# Patient Record
Sex: MALE | Race: WHITE | Employment: OTHER | URBAN - METROPOLITAN AREA
[De-identification: names, ages, dates, MRNs, and addresses within clinical notes are randomized per-mention and may not be internally consistent; named-entity substitution may affect disease eponyms.]

---

## 2022-03-23 ENCOUNTER — OUTSIDE FACILITY SERVICE (OUTPATIENT)
Dept: CARDIOLOGY | Facility: CLINIC | Age: 87
End: 2022-03-23

## 2022-03-23 PROCEDURE — 93016 CV STRESS TEST SUPVJ ONLY: CPT | Performed by: NURSE PRACTITIONER

## 2022-03-23 PROCEDURE — 93018 CV STRESS TEST I&R ONLY: CPT | Performed by: INTERNAL MEDICINE

## 2022-03-23 PROCEDURE — 78452 HT MUSCLE IMAGE SPECT MULT: CPT | Performed by: INTERNAL MEDICINE

## 2022-04-11 ENCOUNTER — OFFICE VISIT (OUTPATIENT)
Dept: CARDIOLOGY | Facility: CLINIC | Age: 87
End: 2022-04-11

## 2022-04-11 VITALS
SYSTOLIC BLOOD PRESSURE: 154 MMHG | DIASTOLIC BLOOD PRESSURE: 78 MMHG | WEIGHT: 173 LBS | BODY MASS INDEX: 26.22 KG/M2 | OXYGEN SATURATION: 96 % | HEIGHT: 68 IN | HEART RATE: 111 BPM

## 2022-04-11 DIAGNOSIS — R94.39 ABNORMAL STRESS TEST: ICD-10-CM

## 2022-04-11 DIAGNOSIS — E78.5 HYPERLIPIDEMIA LDL GOAL <100: ICD-10-CM

## 2022-04-11 DIAGNOSIS — I48.0 PAROXYSMAL ATRIAL FIBRILLATION: ICD-10-CM

## 2022-04-11 DIAGNOSIS — I10 ESSENTIAL HYPERTENSION: ICD-10-CM

## 2022-04-11 DIAGNOSIS — R07.9 CHEST PAIN, UNSPECIFIED TYPE: Primary | ICD-10-CM

## 2022-04-11 PROCEDURE — 99204 OFFICE O/P NEW MOD 45 MIN: CPT | Performed by: INTERNAL MEDICINE

## 2022-04-11 PROCEDURE — 93000 ELECTROCARDIOGRAM COMPLETE: CPT | Performed by: INTERNAL MEDICINE

## 2022-04-11 RX ORDER — LANOLIN ALCOHOL/MO/W.PET/CERES
400 CREAM (GRAM) TOPICAL DAILY
COMMUNITY

## 2022-04-11 RX ORDER — NITROFURANTOIN MACROCRYSTALS 50 MG/1
50 CAPSULE ORAL AS NEEDED
COMMUNITY

## 2022-04-11 RX ORDER — BIOTIN 1 MG
4 TABLET ORAL DAILY
COMMUNITY

## 2022-04-11 RX ORDER — CARBOXYMETHYLCELLULOSE SODIUM 5 MG/ML
SOLUTION/ DROPS OPHTHALMIC AS NEEDED
COMMUNITY

## 2022-04-11 RX ORDER — DIPHENOXYLATE HYDROCHLORIDE AND ATROPINE SULFATE 2.5; .025 MG/1; MG/1
2 TABLET ORAL DAILY
COMMUNITY
End: 2022-08-08

## 2022-04-11 RX ORDER — ASPIRIN 81 MG/1
81 TABLET ORAL DAILY
Qty: 90 TABLET | Refills: 3 | Status: SHIPPED | OUTPATIENT
Start: 2022-04-11 | End: 2022-08-08

## 2022-04-11 RX ORDER — DIPHENOXYLATE HYDROCHLORIDE AND ATROPINE SULFATE 2.5; .025 MG/1; MG/1
1 TABLET ORAL 3 TIMES DAILY
COMMUNITY
End: 2022-08-08

## 2022-04-11 RX ORDER — LORATADINE 10 MG/1
10 CAPSULE, LIQUID FILLED ORAL DAILY
COMMUNITY
End: 2022-08-08

## 2022-04-11 RX ORDER — METOPROLOL SUCCINATE 25 MG/1
TABLET, EXTENDED RELEASE ORAL
COMMUNITY

## 2022-04-11 NOTE — PROGRESS NOTES
OFFICE VISIT  NOTE  [facility] CARDIOLOGY      Name: Phillip Addison    Date: 2022  MRN:  [id number]  :  1935      REFERRING/PRIMARY PROVIDER:  Fernando Dodson MD    Chief Complaint   Patient presents with   • stress test results       HPI: Phillip Addison is a 86 y.o. male who presents today for new consultation for chest pain.  History of persistent atrial fibrillation diagnosed in the mid  intolerant to multiple antiarrhythmics, currently rate controlled with metoprolol and anticoagulated with Eliquis.  Reports left-sided focal chest pain sometimes midsternal, usually occurs at rest, improves after 15 to 20 minutes of walking.  He walks 1.5 miles each morning.  Denies shortness of breath dizziness or lightheadedness.  Planing a trip to [employer] [employer] [facility] [employer] mid May.    Past Medical History:   Diagnosis Date   • Atrial fibrillation (HCC)        Past Surgical History:   Procedure Laterality Date   • APPENDECTOMY     • CATARACT EXTRACTION     • COLONOSCOPY     • HERNIA REPAIR     • PROSTATE SURGERY     • SHOULDER SURGERY Right        Social History     Socioeconomic History   • Marital status: Unknown       History reviewed. No pertinent family history.     ROS:   Constitutional no fever,  no weight loss   Skin no rash, no subcutaneous nodules   Otolaryngeal no difficulty swallowing   Cardiovascular See HPI   Pulmonary no cough, no sputum production   Gastrointestinal no constipation, no diarrhea   Genitourinary no dysuria, no hematuria   Hematologic no easy bruisability, no abnormal bleeding   Musculoskeletal no muscle pain   Neurologic no dizziness, no falls         Allergies   Allergen Reactions   • Procainamide Unknown - High Severity and Other (See Comments)     Drug induced lupus     • Aminosyn Other (See Comments)     Worsens AF   • Atorvastatin Unknown - High Severity   • Caffeine Unknown - High Severity and Other (See Comments)     Worsens AF     • Lansoprazole Other (See Comments)     Worsens AF  "  • Pantoprazole Sodium Other (See Comments)     Worsens AF   • Penicillins Other (See Comments) and Unknown - High Severity     Per pt \"does not work\"     • Procaine Unknown - High Severity   • Sulfamethoxazole-Trimethoprim Unknown - High Severity   • Sulfites Unknown - High Severity     Worsens AF         Current Outpatient Medications:   •  apixaban (ELIQUIS) 5 MG tablet tablet, Take 5 mg by mouth 2 (Two) Times a Day. 1.5 tablet daily, Disp: , Rfl:   •  carboxymethylcellulose (REFRESH PLUS) 0.5 % solution, As Needed for Dry Eyes., Disp: , Rfl:   •  Cholecalciferol (VITAMIN D3 PO), Take 500 mcg by mouth Daily., Disp: , Rfl:   •  Dentifrices (BIOTENE DRY MOUTH DT), Apply  to teeth As Needed., Disp: , Rfl:   •  Fiber, Corn Dextrin, powder, Take 4 g by mouth Daily., Disp: , Rfl:   •  folic acid (FOLVITE) 400 MCG tablet, Take 400 mcg by mouth Daily., Disp: , Rfl:   •  Loratadine 10 MG capsule, Take 10 mg by mouth Daily., Disp: , Rfl:   •  metoprolol succinate XL (TOPROL-XL) 25 MG 24 hr tablet, Take 25 mg by mouth 2 (Two) Times a Day. 0.5 tablet daily, Disp: , Rfl:   •  multivitamin (THERAGRAN) tablet tablet, Take 1 tablet by mouth Daily. Areds 2, Disp: , Rfl:   •  multivitamin (THERAGRAN) tablet tablet, Take 1 tablet by mouth Daily. H.a joint formula, Disp: , Rfl:   •  nitrofurantoin (MACRODANTIN) 50 MG capsule, Take 50 mg by mouth As Needed., Disp: , Rfl:   •  Omeprazole Magnesium (PRILOSEC PO), Take  by mouth. 0.5 tablet daily prn, Disp: , Rfl:   •  aspirin (aspirin) 81 MG EC tablet, Take 1 tablet by mouth Daily., Disp: 90 tablet, Rfl: 3    Vitals:    04/11/22 0852   BP: 154/78   BP Location: Right arm   Patient Position: Sitting   Pulse: 111   SpO2: 96%   Weight: 78.5 kg (173 lb)   Height: 172.7 cm (68\")     Body mass index is 26.3 kg/m².    PHYSICAL EXAM:    General Appearance:   · well developed  · well nourished  HENT:   · oropharynx moist  · lips not cyanotic  Neck:  · thyroid not " enlarged  · supple  Respiratory:  · no respiratory distress  · normal breath sounds  · no rales  Cardiovascular:  · no jugular venous distention  · regular rhythm  · apical impulse normal  · S1 normal, S2 normal  · no S3, no S4   · no murmur  · no rub, no thrill  · carotid pulses normal; no bruit  · lower extremity edema: none      Musculoskeletal:  · no clubbing of fingers.   · normocephalic, head atraumatic  Skin:   · warm, dry  Psychiatric:  · judgement and insight appropriate  · normal mood and affect    RESULTS:     ECG 12 Lead    Date/Time: 4/11/2022 9:56 AM  Performed by: Norman Rankin MD  Authorized by: Norman Rankin MD   Comparison: not compared with previous ECG   Previous ECG: no previous ECG available  Rhythm: atrial fibrillation  Rate: normal  BPM: 90  QRS axis: normal    Clinical impression: non-specific ECG                  Labs:  No results found for: CHOL, TRIG, HDL, LDL, AST, ALT  No results found for: HGBA1C  No components found for: CREATINININE  No results found for: EGFRIFNONA    Most recent PCP note, imaging tests, and labs reviewed.    ASSESSMENT:  Problem List Items Addressed This Visit        Cardiac and Vasculature    Chest pain - Primary    Overview     3/23/22 Stress test: Inferior and inferior septal myocardial ischemia           Relevant Orders    Case Request Cath Lab: Left Heart Cath (Completed)    Adult Transthoracic Echo Complete W/ Cont if Necessary Per Protocol    Paroxysmal atrial fibrillation (HCC)    Overview     12/2021 48-hour Holter: 100% A.Fib, pauses up to 3.4 seconds, 0.1% PVCs           Relevant Medications    metoprolol succinate XL (TOPROL-XL) 25 MG 24 hr tablet    Other Relevant Orders    Case Request Cath Lab: Left Heart Cath (Completed)    Adult Transthoracic Echo Complete W/ Cont if Necessary Per Protocol    Essential hypertension    Relevant Medications    metoprolol succinate XL (TOPROL-XL) 25 MG 24 hr tablet    Other Relevant Orders    Case Request Cath  Lab: Left Heart Cath (Completed)    Adult Transthoracic Echo Complete W/ Cont if Necessary Per Protocol    Hyperlipidemia LDL goal <100    Relevant Orders    Case Request Cath Lab: Left Heart Cath (Completed)    Adult Transthoracic Echo Complete W/ Cont if Necessary Per Protocol      Other Visit Diagnoses     Abnormal stress test        Relevant Orders    Case Request Cath Lab: Left Heart Cath (Completed)    Adult Transthoracic Echo Complete W/ Cont if Necessary Per Protocol          PLAN:    1.  Chest pain with abnormal nuclear stress test:  Stress test 3/23/2022 at [facility] read by Dr. Yan showed inferior and inferoseptal ischemia with normal ejection fraction.  Given patient's ongoing chest pain I recommend cardiac catheterization, risk benefits and alternatives explained in detail the patient, he is agreeable.  Start aspirin 81 mg daily  Check echo day of cath  The patient was advised to call 911 if chest pain worsens or persist despite  Rest.    2.  Persistent atrial fibrillation:  Continue rate control strategy  Hold Eliquis 48 hours prior to procedures  Continue metoprolol at current dose  Holter 12/2021 showed average heart rate 70, 1% A. Fib    3.  Hypertension  Long-term goal blood pressure less than 140/90  Continue metoprolol    4.  Hyperlipidemia:  We will check lipids day of cath      Advance Care Planning   ACP discussion was held with the patient during this visit. Patient does not have an advance directive, information provided.         Return to clinic in 4 months, or sooner as needed.    Thank you for the opportunity to share in the care of your patient; please do not hesitate to call me with any questions.     Norman Rankin MD, FACC  Office: [phone]  [facility] [place] [address]    04/11/22

## 2022-04-11 NOTE — H&P (VIEW-ONLY)
OFFICE VISIT  NOTE  [facility] CARDIOLOGY      Name: hPillip Addison    Date: 2022  MRN:  [id number]  :  1935      REFERRING/PRIMARY PROVIDER:  Fernando Dodson MD    Chief Complaint   Patient presents with   • stress test results       HPI: Phillip Addison is a 86 y.o. male who presents today for new consultation for chest pain.  History of persistent atrial fibrillation diagnosed in the mid  intolerant to multiple antiarrhythmics, currently rate controlled with metoprolol and anticoagulated with Eliquis.  Reports left-sided focal chest pain sometimes midsternal, usually occurs at rest, improves after 15 to 20 minutes of walking.  He walks 1.5 miles each morning.  Denies shortness of breath dizziness or lightheadedness.  Planing a trip to [facility] mid May.    Past Medical History:   Diagnosis Date   • Atrial fibrillation (HCC)        Past Surgical History:   Procedure Laterality Date   • APPENDECTOMY     • CATARACT EXTRACTION     • COLONOSCOPY     • HERNIA REPAIR     • PROSTATE SURGERY     • SHOULDER SURGERY Right        Social History     Socioeconomic History   • Marital status: Unknown       History reviewed. No pertinent family history.     ROS:   Constitutional no fever,  no weight loss   Skin no rash, no subcutaneous nodules   Otolaryngeal no difficulty swallowing   Cardiovascular See HPI   Pulmonary no cough, no sputum production   Gastrointestinal no constipation, no diarrhea   Genitourinary no dysuria, no hematuria   Hematologic no easy bruisability, no abnormal bleeding   Musculoskeletal no muscle pain   Neurologic no dizziness, no falls         Allergies   Allergen Reactions   • Procainamide Unknown - High Severity and Other (See Comments)     Drug induced lupus     • Aminosyn Other (See Comments)     Worsens AF   • Atorvastatin Unknown - High Severity   • Caffeine Unknown - High Severity and Other (See Comments)     Worsens AF     • Lansoprazole Other (See Comments)     Worsens AF  "  • Pantoprazole Sodium Other (See Comments)     Worsens AF   • Penicillins Other (See Comments) and Unknown - High Severity     Per pt \"does not work\"     • Procaine Unknown - High Severity   • Sulfamethoxazole-Trimethoprim Unknown - High Severity   • Sulfites Unknown - High Severity     Worsens AF         Current Outpatient Medications:   •  apixaban (ELIQUIS) 5 MG tablet tablet, Take 5 mg by mouth 2 (Two) Times a Day. 1.5 tablet daily, Disp: , Rfl:   •  carboxymethylcellulose (REFRESH PLUS) 0.5 % solution, As Needed for Dry Eyes., Disp: , Rfl:   •  Cholecalciferol (VITAMIN D3 PO), Take 500 mcg by mouth Daily., Disp: , Rfl:   •  Dentifrices (BIOTENE DRY MOUTH DT), Apply  to teeth As Needed., Disp: , Rfl:   •  Fiber, Corn Dextrin, powder, Take 4 g by mouth Daily., Disp: , Rfl:   •  folic acid (FOLVITE) 400 MCG tablet, Take 400 mcg by mouth Daily., Disp: , Rfl:   •  Loratadine 10 MG capsule, Take 10 mg by mouth Daily., Disp: , Rfl:   •  metoprolol succinate XL (TOPROL-XL) 25 MG 24 hr tablet, Take 25 mg by mouth 2 (Two) Times a Day. 0.5 tablet daily, Disp: , Rfl:   •  multivitamin (THERAGRAN) tablet tablet, Take 1 tablet by mouth Daily. Areds 2, Disp: , Rfl:   •  multivitamin (THERAGRAN) tablet tablet, Take 1 tablet by mouth Daily. H.a joint formula, Disp: , Rfl:   •  nitrofurantoin (MACRODANTIN) 50 MG capsule, Take 50 mg by mouth As Needed., Disp: , Rfl:   •  Omeprazole Magnesium (PRILOSEC PO), Take  by mouth. 0.5 tablet daily prn, Disp: , Rfl:   •  aspirin (aspirin) 81 MG EC tablet, Take 1 tablet by mouth Daily., Disp: 90 tablet, Rfl: 3    Vitals:    04/11/22 0852   BP: 154/78   BP Location: Right arm   Patient Position: Sitting   Pulse: 111   SpO2: 96%   Weight: 78.5 kg (173 lb)   Height: 172.7 cm (68\")     Body mass index is 26.3 kg/m².    PHYSICAL EXAM:    General Appearance:   · well developed  · well nourished  HENT:   · oropharynx moist  · lips not cyanotic  Neck:  · thyroid not " enlarged  · supple  Respiratory:  · no respiratory distress  · normal breath sounds  · no rales  Cardiovascular:  · no jugular venous distention  · regular rhythm  · apical impulse normal  · S1 normal, S2 normal  · no S3, no S4   · no murmur  · no rub, no thrill  · carotid pulses normal; no bruit  · lower extremity edema: none      Musculoskeletal:  · no clubbing of fingers.   · normocephalic, head atraumatic  Skin:   · warm, dry  Psychiatric:  · judgement and insight appropriate  · normal mood and affect    RESULTS:     ECG 12 Lead    Date/Time: 4/11/2022 9:56 AM  Performed by: Norman Rankin MD  Authorized by: Norman Rankin MD   Comparison: not compared with previous ECG   Previous ECG: no previous ECG available  Rhythm: atrial fibrillation  Rate: normal  BPM: 90  QRS axis: normal    Clinical impression: non-specific ECG                  Labs:  No results found for: CHOL, TRIG, HDL, LDL, AST, ALT  No results found for: HGBA1C  No components found for: CREATINININE  No results found for: EGFRIFNONA    Most recent PCP note, imaging tests, and labs reviewed.    ASSESSMENT:  Problem List Items Addressed This Visit        Cardiac and Vasculature    Chest pain - Primary    Overview     3/23/22 Stress test: Inferior and inferior septal myocardial ischemia           Relevant Orders    Case Request Cath Lab: Left Heart Cath (Completed)    Adult Transthoracic Echo Complete W/ Cont if Necessary Per Protocol    Paroxysmal atrial fibrillation (HCC)    Overview     12/2021 48-hour Holter: 100% A.Fib, pauses up to 3.4 seconds, 0.1% PVCs           Relevant Medications    metoprolol succinate XL (TOPROL-XL) 25 MG 24 hr tablet    Other Relevant Orders    Case Request Cath Lab: Left Heart Cath (Completed)    Adult Transthoracic Echo Complete W/ Cont if Necessary Per Protocol    Essential hypertension    Relevant Medications    metoprolol succinate XL (TOPROL-XL) 25 MG 24 hr tablet    Other Relevant Orders    Case Request Cath  Lab: Left Heart Cath (Completed)    Adult Transthoracic Echo Complete W/ Cont if Necessary Per Protocol    Hyperlipidemia LDL goal <100    Relevant Orders    Case Request Cath Lab: Left Heart Cath (Completed)    Adult Transthoracic Echo Complete W/ Cont if Necessary Per Protocol      Other Visit Diagnoses     Abnormal stress test        Relevant Orders    Case Request Cath Lab: Left Heart Cath (Completed)    Adult Transthoracic Echo Complete W/ Cont if Necessary Per Protocol          PLAN:    1.  Chest pain with abnormal nuclear stress test:  Stress test 3/23/2022 at [facility] read by Dr. Yan showed inferior and inferoseptal ischemia with normal ejection fraction.  Given patient's ongoing chest pain I recommend cardiac catheterization, risk benefits and alternatives explained in detail the patient, he is agreeable.  Start aspirin 81 mg daily  Check echo day of cath  The patient was advised to call 911 if chest pain worsens or persist despite  Rest.    2.  Persistent atrial fibrillation:  Continue rate control strategy  Hold Eliquis 48 hours prior to procedures  Continue metoprolol at current dose  Holter 12/2021 showed average heart rate 70, 1% A. Fib    3.  Hypertension  Long-term goal blood pressure less than 140/90  Continue metoprolol    4.  Hyperlipidemia:  We will check lipids day of cath      Advance Care Planning   ACP discussion was held with the patient during this visit. Patient does not have an advance directive, information provided.         Return to clinic in 4 months, or sooner as needed.    Thank you for the opportunity to share in the care of your patient; please do not hesitate to call me with any questions.     Norman Rankin MD, FACC  Office: [phone]  [facility] [place] [address]    04/11/22

## 2022-04-12 PROBLEM — R94.39 ABNORMAL STRESS TEST: Status: ACTIVE | Noted: 2022-04-12

## 2022-04-18 ENCOUNTER — PREP FOR SURGERY (OUTPATIENT)
Dept: OTHER | Facility: HOSPITAL | Age: 87
End: 2022-04-18

## 2022-04-18 RX ORDER — SODIUM CHLORIDE 0.9 % (FLUSH) 0.9 %
10 SYRINGE (ML) INJECTION AS NEEDED
Status: CANCELLED | OUTPATIENT
Start: 2022-04-18

## 2022-04-18 RX ORDER — ACETAMINOPHEN 325 MG/1
650 TABLET ORAL EVERY 4 HOURS PRN
Status: CANCELLED | OUTPATIENT
Start: 2022-04-18

## 2022-04-18 RX ORDER — SODIUM CHLORIDE 0.9 % (FLUSH) 0.9 %
10 SYRINGE (ML) INJECTION EVERY 12 HOURS SCHEDULED
Status: CANCELLED | OUTPATIENT
Start: 2022-04-18

## 2022-04-18 RX ORDER — ASPIRIN 81 MG/1
324 TABLET, CHEWABLE ORAL ONCE
Status: CANCELLED | OUTPATIENT
Start: 2022-04-18 | End: 2022-04-18

## 2022-04-18 RX ORDER — ASPIRIN 81 MG/1
81 TABLET ORAL DAILY
Status: CANCELLED | OUTPATIENT
Start: 2022-04-19

## 2022-04-18 RX ORDER — NITROGLYCERIN 0.4 MG/1
0.4 TABLET SUBLINGUAL
Status: CANCELLED | OUTPATIENT
Start: 2022-04-18

## 2022-04-20 ENCOUNTER — HOSPITAL ENCOUNTER (OUTPATIENT)
Facility: HOSPITAL | Age: 87
Setting detail: HOSPITAL OUTPATIENT SURGERY
Discharge: HOME | End: 2022-04-20
Attending: INTERNAL MEDICINE | Admitting: INTERNAL MEDICINE

## 2022-04-20 ENCOUNTER — APPOINTMENT (OUTPATIENT)
Dept: CARDIOLOGY | Facility: HOSPITAL | Age: 87
End: 2022-04-20

## 2022-04-20 VITALS
TEMPERATURE: 96.8 F | OXYGEN SATURATION: 94 % | HEART RATE: 68 BPM | WEIGHT: 167.55 LBS | RESPIRATION RATE: 16 BRPM | DIASTOLIC BLOOD PRESSURE: 80 MMHG | SYSTOLIC BLOOD PRESSURE: 143 MMHG | HEIGHT: 68 IN | BODY MASS INDEX: 25.39 KG/M2

## 2022-04-20 DIAGNOSIS — R94.39 ABNORMAL STRESS TEST: ICD-10-CM

## 2022-04-20 DIAGNOSIS — E78.5 HYPERLIPIDEMIA LDL GOAL <100: ICD-10-CM

## 2022-04-20 DIAGNOSIS — I48.0 PAROXYSMAL ATRIAL FIBRILLATION: ICD-10-CM

## 2022-04-20 DIAGNOSIS — I10 ESSENTIAL HYPERTENSION: ICD-10-CM

## 2022-04-20 DIAGNOSIS — I25.118 CORONARY ARTERY DISEASE OF NATIVE ARTERY OF NATIVE HEART WITH STABLE ANGINA PECTORIS: Primary | ICD-10-CM

## 2022-04-20 DIAGNOSIS — R07.9 CHEST PAIN, UNSPECIFIED TYPE: ICD-10-CM

## 2022-04-20 LAB
ACT BLD: 255 SECONDS (ref 82–152)
ALBUMIN SERPL-MCNC: 4.6 G/DL (ref 3.5–5.2)
ALBUMIN/GLOB SERPL: 2.1 G/DL
ALP SERPL-CCNC: 80 U/L (ref 39–117)
ALT SERPL W P-5'-P-CCNC: 16 U/L (ref 1–41)
ANION GAP SERPL CALCULATED.3IONS-SCNC: 9 MMOL/L (ref 5–15)
ASCENDING AORTA: 3.6 CM
AST SERPL-CCNC: 20 U/L (ref 1–40)
BH CV ECHO MEAS - AI DEC SLOPE: 117.4 CM/SEC^2
BH CV ECHO MEAS - AI MAX PG: 52.4 MMHG
BH CV ECHO MEAS - AI MAX VEL: 362 CM/SEC
BH CV ECHO MEAS - AI P1/2T: 903.3 MSEC
BH CV ECHO MEAS - AO MAX PG (FULL): 4.6 MMHG
BH CV ECHO MEAS - AO MAX PG: 7 MMHG
BH CV ECHO MEAS - AO MEAN PG (FULL): 2.3 MMHG
BH CV ECHO MEAS - AO MEAN PG: 3.6 MMHG
BH CV ECHO MEAS - AO ROOT AREA (BSA CORRECTED): 2
BH CV ECHO MEAS - AO ROOT AREA: 10.9 CM^2
BH CV ECHO MEAS - AO ROOT DIAM: 3.7 CM
BH CV ECHO MEAS - AO V2 MAX: 132.1 CM/SEC
BH CV ECHO MEAS - AO V2 MEAN: 87.9 CM/SEC
BH CV ECHO MEAS - AO V2 VTI: 32.5 CM
BH CV ECHO MEAS - ASC AORTA: 3.6 CM
BH CV ECHO MEAS - AVA(I,A): 2.5 CM^2
BH CV ECHO MEAS - AVA(I,D): 2.5 CM^2
BH CV ECHO MEAS - AVA(V,A): 2.3 CM^2
BH CV ECHO MEAS - AVA(V,D): 2.3 CM^2
BH CV ECHO MEAS - BSA(HAYCOCK): 1.9 M^2
BH CV ECHO MEAS - BSA: 1.9 M^2
BH CV ECHO MEAS - BZI_BMI: 25.4 KILOGRAMS/M^2
BH CV ECHO MEAS - BZI_METRIC_HEIGHT: 172.7 CM
BH CV ECHO MEAS - BZI_METRIC_WEIGHT: 75.8 KG
BH CV ECHO MEAS - EDV(CUBED): 52.5 ML
BH CV ECHO MEAS - EDV(MOD-SP2): 76.2 ML
BH CV ECHO MEAS - EDV(MOD-SP4): 83.3 ML
BH CV ECHO MEAS - EDV(TEICH): 59.8 ML
BH CV ECHO MEAS - EF(CUBED): 69 %
BH CV ECHO MEAS - EF(MOD-BP): 56.1 %
BH CV ECHO MEAS - EF(MOD-SP2): 65.4 %
BH CV ECHO MEAS - EF(MOD-SP4): 56.1 %
BH CV ECHO MEAS - EF(TEICH): 61.4 %
BH CV ECHO MEAS - ESV(CUBED): 16.3 ML
BH CV ECHO MEAS - ESV(MOD-SP2): 26.4 ML
BH CV ECHO MEAS - ESV(MOD-SP4): 36.6 ML
BH CV ECHO MEAS - ESV(TEICH): 23.1 ML
BH CV ECHO MEAS - FS: 32.3 %
BH CV ECHO MEAS - IVS/LVPW: 0.88
BH CV ECHO MEAS - IVSD: 1.3 CM
BH CV ECHO MEAS - LA DIMENSION: 5.4 CM
BH CV ECHO MEAS - LA/AO: 1.4
BH CV ECHO MEAS - LAD MAJOR: 6.1 CM
BH CV ECHO MEAS - LAT PEAK E' VEL: 11.3 CM/SEC
BH CV ECHO MEAS - LATERAL E/E' RATIO: 7.8
BH CV ECHO MEAS - LV DIASTOLIC VOL/BSA (35-75): 44 ML/M^2
BH CV ECHO MEAS - LV IVRT: 0.08 SEC
BH CV ECHO MEAS - LV MASS(C)D: 176.7 GRAMS
BH CV ECHO MEAS - LV MASS(C)DI: 93.4 GRAMS/M^2
BH CV ECHO MEAS - LV MAX PG: 2.4 MMHG
BH CV ECHO MEAS - LV MEAN PG: 1.3 MMHG
BH CV ECHO MEAS - LV SYSTOLIC VOL/BSA (12-30): 19.3 ML/M^2
BH CV ECHO MEAS - LV V1 MAX: 77.5 CM/SEC
BH CV ECHO MEAS - LV V1 MEAN: 53.4 CM/SEC
BH CV ECHO MEAS - LV V1 VTI: 20.5 CM
BH CV ECHO MEAS - LVIDD: 3.7 CM
BH CV ECHO MEAS - LVIDS: 2.5 CM
BH CV ECHO MEAS - LVLD AP2: 7.9 CM
BH CV ECHO MEAS - LVLD AP4: 8 CM
BH CV ECHO MEAS - LVLS AP2: 6.7 CM
BH CV ECHO MEAS - LVLS AP4: 7.4 CM
BH CV ECHO MEAS - LVOT AREA (M): 3.8 CM^2
BH CV ECHO MEAS - LVOT AREA: 4 CM^2
BH CV ECHO MEAS - LVOT DIAM: 2.2 CM
BH CV ECHO MEAS - LVPWD: 1.4 CM
BH CV ECHO MEAS - MED PEAK E' VEL: 11.3 CM/SEC
BH CV ECHO MEAS - MEDIAL E/E' RATIO: 7.8
BH CV ECHO MEAS - MV DEC SLOPE: 448.8 CM/SEC^2
BH CV ECHO MEAS - MV DEC TIME: 0.18 SEC
BH CV ECHO MEAS - MV E MAX VEL: 87.8 CM/SEC
BH CV ECHO MEAS - MV MAX PG: 4.3 MMHG
BH CV ECHO MEAS - MV MEAN PG: 1.4 MMHG
BH CV ECHO MEAS - MV P1/2T MAX VEL: 102.6 CM/SEC
BH CV ECHO MEAS - MV P1/2T: 66.9 MSEC
BH CV ECHO MEAS - MV V2 MAX: 103.8 CM/SEC
BH CV ECHO MEAS - MV V2 MEAN: 49.9 CM/SEC
BH CV ECHO MEAS - MV V2 VTI: 24.3 CM
BH CV ECHO MEAS - MVA P1/2T LCG: 2.1 CM^2
BH CV ECHO MEAS - MVA(P1/2T): 3.3 CM^2
BH CV ECHO MEAS - MVA(VTI): 3.3 CM^2
BH CV ECHO MEAS - PA ACC TIME: 0.17 SEC
BH CV ECHO MEAS - PA MAX PG: 1.6 MMHG
BH CV ECHO MEAS - PA PR(ACCEL): 4.5 MMHG
BH CV ECHO MEAS - PA V2 MAX: 63.6 CM/SEC
BH CV ECHO MEAS - RAP SYSTOLE: 8 MMHG
BH CV ECHO MEAS - RVSP: 34 MMHG
BH CV ECHO MEAS - SI(AO): 186.7 ML/M^2
BH CV ECHO MEAS - SI(CUBED): 19.1 ML/M^2
BH CV ECHO MEAS - SI(LVOT): 42.9 ML/M^2
BH CV ECHO MEAS - SI(MOD-SP2): 26.3 ML/M^2
BH CV ECHO MEAS - SI(MOD-SP4): 24.7 ML/M^2
BH CV ECHO MEAS - SI(TEICH): 19.4 ML/M^2
BH CV ECHO MEAS - SV(AO): 353.5 ML
BH CV ECHO MEAS - SV(CUBED): 36.2 ML
BH CV ECHO MEAS - SV(LVOT): 81.2 ML
BH CV ECHO MEAS - SV(MOD-SP2): 49.8 ML
BH CV ECHO MEAS - SV(MOD-SP4): 46.7 ML
BH CV ECHO MEAS - SV(TEICH): 36.7 ML
BH CV ECHO MEAS - TAPSE (>1.6): 1.7 CM
BH CV ECHO MEAS - TR MAX PG: 26 MMHG
BH CV ECHO MEAS - TR MAX VEL: 256 CM/SEC
BH CV ECHO MEASUREMENTS AVERAGE E/E' RATIO: 7.77
BH CV VAS BP LEFT ARM: NORMAL MMHG
BH CV XLRA - RV BASE: 4.2 CM
BH CV XLRA - RV LENGTH: 6.4 CM
BH CV XLRA - RV MID: 2.6 CM
BH CV XLRA - TDI S': 12.1 CM/SEC
BILIRUB SERPL-MCNC: 0.6 MG/DL (ref 0–1.2)
BUN SERPL-MCNC: 21 MG/DL (ref 8–23)
BUN/CREAT SERPL: 20.2 (ref 7–25)
CALCIUM SPEC-SCNC: 9.4 MG/DL (ref 8.6–10.5)
CHLORIDE SERPL-SCNC: 106 MMOL/L (ref 98–107)
CHOLEST SERPL-MCNC: 216 MG/DL (ref 0–200)
CO2 SERPL-SCNC: 26 MMOL/L (ref 22–29)
CREAT BLDA-MCNC: 1 MG/DL (ref 0.6–1.3)
CREAT SERPL-MCNC: 1.04 MG/DL (ref 0.76–1.27)
DEPRECATED RDW RBC AUTO: 46.5 FL (ref 37–54)
EGFRCR SERPLBLD CKD-EPI 2021: 69.9 ML/MIN/1.73
ERYTHROCYTE [DISTWIDTH] IN BLOOD BY AUTOMATED COUNT: 12.3 % (ref 12.3–15.4)
GLOBULIN UR ELPH-MCNC: 2.2 GM/DL
GLUCOSE SERPL-MCNC: 112 MG/DL (ref 65–99)
HBA1C MFR BLD: 5.6 % (ref 4.8–5.6)
HCT VFR BLD AUTO: 43.1 % (ref 37.5–51)
HDLC SERPL-MCNC: 62 MG/DL (ref 40–60)
HGB BLD-MCNC: 14.5 G/DL (ref 13–17.7)
LDLC SERPL CALC-MCNC: 139 MG/DL (ref 0–100)
LDLC/HDLC SERPL: 2.22 {RATIO}
LEFT ATRIUM VOLUME INDEX: 42.1 ML/M^2
LEFT ATRIUM VOLUME: 79.7 ML
MCH RBC QN AUTO: 34.5 PG (ref 26.6–33)
MCHC RBC AUTO-ENTMCNC: 33.6 G/DL (ref 31.5–35.7)
MCV RBC AUTO: 102.6 FL (ref 79–97)
PLATELET # BLD AUTO: 222 10*3/MM3 (ref 140–450)
PMV BLD AUTO: 10.1 FL (ref 6–12)
POTASSIUM SERPL-SCNC: 4.4 MMOL/L (ref 3.5–5.2)
PROT SERPL-MCNC: 6.8 G/DL (ref 6–8.5)
QT INTERVAL: 462 MS
QTC INTERVAL: 425 MS
RBC # BLD AUTO: 4.2 10*6/MM3 (ref 4.14–5.8)
SODIUM SERPL-SCNC: 141 MMOL/L (ref 136–145)
TRIGL SERPL-MCNC: 82 MG/DL (ref 0–150)
VLDLC SERPL-MCNC: 15 MG/DL (ref 5–40)
WBC NRBC COR # BLD: 8.04 10*3/MM3 (ref 3.4–10.8)

## 2022-04-20 PROCEDURE — C1725 CATH, TRANSLUMIN NON-LASER: HCPCS | Performed by: INTERNAL MEDICINE

## 2022-04-20 PROCEDURE — 93306 TTE W/DOPPLER COMPLETE: CPT | Performed by: INTERNAL MEDICINE

## 2022-04-20 PROCEDURE — 93458 L HRT ARTERY/VENTRICLE ANGIO: CPT | Performed by: INTERNAL MEDICINE

## 2022-04-20 PROCEDURE — 80061 LIPID PANEL: CPT | Performed by: PHYSICIAN ASSISTANT

## 2022-04-20 PROCEDURE — 0 IOPAMIDOL PER 1 ML: Performed by: INTERNAL MEDICINE

## 2022-04-20 PROCEDURE — 93005 ELECTROCARDIOGRAM TRACING: CPT | Performed by: INTERNAL MEDICINE

## 2022-04-20 PROCEDURE — C1874 STENT, COATED/COV W/DEL SYS: HCPCS | Performed by: INTERNAL MEDICINE

## 2022-04-20 PROCEDURE — 25010000002 HEPARIN (PORCINE) PER 1000 UNITS: Performed by: INTERNAL MEDICINE

## 2022-04-20 PROCEDURE — 83036 HEMOGLOBIN GLYCOSYLATED A1C: CPT | Performed by: PHYSICIAN ASSISTANT

## 2022-04-20 PROCEDURE — 25010000002 FENTANYL CITRATE (PF) 50 MCG/ML SOLUTION: Performed by: INTERNAL MEDICINE

## 2022-04-20 PROCEDURE — C1769 GUIDE WIRE: HCPCS | Performed by: INTERNAL MEDICINE

## 2022-04-20 PROCEDURE — C1894 INTRO/SHEATH, NON-LASER: HCPCS | Performed by: INTERNAL MEDICINE

## 2022-04-20 PROCEDURE — 82565 ASSAY OF CREATININE: CPT

## 2022-04-20 PROCEDURE — C1887 CATHETER, GUIDING: HCPCS | Performed by: INTERNAL MEDICINE

## 2022-04-20 PROCEDURE — 93306 TTE W/DOPPLER COMPLETE: CPT

## 2022-04-20 PROCEDURE — 85027 COMPLETE CBC AUTOMATED: CPT | Performed by: PHYSICIAN ASSISTANT

## 2022-04-20 PROCEDURE — 92928 PRQ TCAT PLMT NTRAC ST 1 LES: CPT | Performed by: INTERNAL MEDICINE

## 2022-04-20 PROCEDURE — 80053 COMPREHEN METABOLIC PANEL: CPT | Performed by: PHYSICIAN ASSISTANT

## 2022-04-20 PROCEDURE — 25010000002 MIDAZOLAM PER 1 MG: Performed by: INTERNAL MEDICINE

## 2022-04-20 PROCEDURE — C9600 PERC DRUG-EL COR STENT SING: HCPCS | Performed by: INTERNAL MEDICINE

## 2022-04-20 PROCEDURE — 85347 COAGULATION TIME ACTIVATED: CPT

## 2022-04-20 DEVICE — EVEROLIMUS-ELUTING PLATINUM CHROMIUM CORONARY STENT SYSTEM
Type: IMPLANTABLE DEVICE | Status: FUNCTIONAL
Brand: SYNERGY™

## 2022-04-20 RX ORDER — SODIUM CHLORIDE 9 MG/ML
3 INJECTION, SOLUTION INTRAVENOUS CONTINUOUS
Status: ACTIVE | OUTPATIENT
Start: 2022-04-20 | End: 2022-04-20

## 2022-04-20 RX ORDER — NITROGLYCERIN 0.4 MG/1
0.4 TABLET SUBLINGUAL
Status: DISCONTINUED | OUTPATIENT
Start: 2022-04-20 | End: 2022-04-20 | Stop reason: HOSPADM

## 2022-04-20 RX ORDER — VIBEGRON 75 MG/1
0.5 TABLET, FILM COATED ORAL DAILY
COMMUNITY

## 2022-04-20 RX ORDER — HEPARIN SODIUM 1000 [USP'U]/ML
INJECTION, SOLUTION INTRAVENOUS; SUBCUTANEOUS AS NEEDED
Status: DISCONTINUED | OUTPATIENT
Start: 2022-04-20 | End: 2022-04-20 | Stop reason: HOSPADM

## 2022-04-20 RX ORDER — ASPIRIN 81 MG/1
81 TABLET ORAL DAILY
Status: DISCONTINUED | OUTPATIENT
Start: 2022-04-21 | End: 2022-04-20 | Stop reason: HOSPADM

## 2022-04-20 RX ORDER — SODIUM CHLORIDE 9 MG/ML
1 INJECTION, SOLUTION INTRAVENOUS CONTINUOUS
Status: ACTIVE | OUTPATIENT
Start: 2022-04-20 | End: 2022-04-20

## 2022-04-20 RX ORDER — SODIUM CHLORIDE 0.9 % (FLUSH) 0.9 %
10 SYRINGE (ML) INJECTION EVERY 12 HOURS SCHEDULED
Status: DISCONTINUED | OUTPATIENT
Start: 2022-04-20 | End: 2022-04-20 | Stop reason: HOSPADM

## 2022-04-20 RX ORDER — NITROGLYCERIN 400 UG/1
SPRAY ORAL AS NEEDED
Status: DISCONTINUED | OUTPATIENT
Start: 2022-04-20 | End: 2022-04-20 | Stop reason: HOSPADM

## 2022-04-20 RX ORDER — CLOPIDOGREL BISULFATE 75 MG/1
75 TABLET ORAL DAILY
Qty: 90 TABLET | Refills: 3 | Status: SHIPPED | OUTPATIENT
Start: 2022-04-20

## 2022-04-20 RX ORDER — SODIUM CHLORIDE 0.9 % (FLUSH) 0.9 %
10 SYRINGE (ML) INJECTION AS NEEDED
Status: DISCONTINUED | OUTPATIENT
Start: 2022-04-20 | End: 2022-04-20 | Stop reason: HOSPADM

## 2022-04-20 RX ORDER — ACETAMINOPHEN 325 MG/1
650 TABLET ORAL EVERY 4 HOURS PRN
Status: DISCONTINUED | OUTPATIENT
Start: 2022-04-20 | End: 2022-04-20 | Stop reason: HOSPADM

## 2022-04-20 RX ORDER — LIDOCAINE HYDROCHLORIDE 10 MG/ML
INJECTION, SOLUTION EPIDURAL; INFILTRATION; INTRACAUDAL; PERINEURAL AS NEEDED
Status: DISCONTINUED | OUTPATIENT
Start: 2022-04-20 | End: 2022-04-20 | Stop reason: HOSPADM

## 2022-04-20 RX ORDER — ASPIRIN 81 MG/1
324 TABLET, CHEWABLE ORAL ONCE
Status: COMPLETED | OUTPATIENT
Start: 2022-04-20 | End: 2022-04-20

## 2022-04-20 RX ORDER — CLOPIDOGREL BISULFATE 75 MG/1
TABLET ORAL AS NEEDED
Status: DISCONTINUED | OUTPATIENT
Start: 2022-04-20 | End: 2022-04-20 | Stop reason: HOSPADM

## 2022-04-20 RX ORDER — MIDAZOLAM HYDROCHLORIDE 1 MG/ML
INJECTION INTRAMUSCULAR; INTRAVENOUS AS NEEDED
Status: DISCONTINUED | OUTPATIENT
Start: 2022-04-20 | End: 2022-04-20 | Stop reason: HOSPADM

## 2022-04-20 RX ORDER — FENTANYL CITRATE 50 UG/ML
INJECTION, SOLUTION INTRAMUSCULAR; INTRAVENOUS AS NEEDED
Status: DISCONTINUED | OUTPATIENT
Start: 2022-04-20 | End: 2022-04-20 | Stop reason: HOSPADM

## 2022-04-20 RX ADMIN — ASPIRIN 81 MG 324 MG: 81 TABLET ORAL at 09:57

## 2022-04-20 RX ADMIN — SODIUM CHLORIDE 3 ML/KG/HR: 9 INJECTION, SOLUTION INTRAVENOUS at 10:03

## 2022-04-20 NOTE — DISCHARGE INSTR - ACTIVITY
Stop aspirin 81 mg daily in 30 days (5/20/2022)  Continue Plavix and Eliquis until notified by Dr. Rankin.

## 2022-04-20 NOTE — INTERVAL H&P NOTE
H&P reviewed. The patient was examined and there are no changes to the H&P.      Vitals and Labs today:  There were no vitals filed for this visit.    Lab Results   Component Value Date    WBC 8.04 04/20/2022    HGB 14.5 04/20/2022    HCT 43.1 04/20/2022    .6 (H) 04/20/2022     04/20/2022     Lab Results   Component Value Date    CREATININE 1.00 04/20/2022     No results found for: HGBA1C  No results found for: CHOL, CHLPL, TRIG, HDL, LDL, LDLDIRECT      The risks, benefits, and alternative options of cardiac catheterization were discussed with the patient.  The risks include death, MI, stroke, infection, vascular injury requiring surgical repair and/or blood transfusion, coronary dissection, renal dysfunction/failure, allergic reaction, emergent CABG.  If PCI is needed there is a 1-2% risk of emergent CABG.  The patient is agreeable for cardiac catheterization, possible PCI or CABG. Plan is to proceed with cardiac catheterization and possible PCI.     Norman Rankin M.D., F.A.C.C.  Interventional Cardiology  04/20/22  10:26 EDT

## 2022-04-21 ENCOUNTER — DOCUMENTATION (OUTPATIENT)
Dept: CARDIAC REHAB | Facility: HOSPITAL | Age: 87
End: 2022-04-21

## 2022-04-21 ENCOUNTER — CALL CENTER PROGRAMS (OUTPATIENT)
Dept: CALL CENTER | Facility: HOSPITAL | Age: 87
End: 2022-04-21

## 2022-04-21 NOTE — OUTREACH NOTE
"PCI/Device Survey    Flowsheet Row Responses   Facility patient discharged from? [facility]   Procedure date 04/20/22   Procedure (if device, specify in description) PCI   PCI site Right, Arm   Performing MD Dr. Norman Rankin   Attempt successful? Yes   Call start time [phone]   Call end time [phone]   Has the patient had any of the following symptoms since discharge? Chest pain  [Pt states, \"very slight\" chest pain that comes and goes. He's not concerned as he was having it prior to procedure.]   Is the patient taking prescribed medications: ASA, Plavix  [Eliquis]   Nursing intervention Reminded to continue to take prescribed medications   Medication comments Pt has concernt r/t all blood thinners. RN advised pt to call provider.    Does the patient have any of the following symptoms related to the cath/surgical site? --  [None noted]   Does the patient have an appointment scheduled with the cardiologist? Yes   Appointment comments Appt with Dr. Rankin is on 8/8/22   If the patient is a current smoker, are they able to teach back resources for cessation? Not a smoker   Did the patient feel prepared to go home on the same day as the procedure? Yes   Is the patient satisfied with the same day discharge process? Yes   Discharge process comments Pt states, \"I'm entirely satisfied with [facility].\"   PCI/Device call completed Yes          JAILYN VASQUEZ - Registered Nurse  "

## 2022-04-25 ENCOUNTER — DOCUMENTATION (OUTPATIENT)
Dept: CARDIAC REHAB | Facility: HOSPITAL | Age: 87
End: 2022-04-25

## 2022-04-25 NOTE — PROGRESS NOTES
Cardiac Rehab staff mailed referral letter to patient regarding Phase II Cardiac Rehab program. Instruction for patient to contact [facility] Cardiac Rehab Department for additional program information and to forward referral to closest facility.

## 2022-04-28 ENCOUNTER — DOCUMENTATION (OUTPATIENT)
Dept: CARDIAC REHAB | Facility: HOSPITAL | Age: 87
End: 2022-04-28

## 2022-04-28 NOTE — PROGRESS NOTES
Pt. Referred for Phase II Cardiac Rehab. Staff discussed benefits of exercise, program protocol, and educational material provided. Teach back verified.  Permission granted from patient for staff to fax referral information to outlying program at this time.  Staff faxed referral info to [facility]  .

## 2022-08-08 ENCOUNTER — OFFICE VISIT (OUTPATIENT)
Dept: CARDIOLOGY | Facility: CLINIC | Age: 87
End: 2022-08-08

## 2022-08-08 VITALS
DIASTOLIC BLOOD PRESSURE: 74 MMHG | OXYGEN SATURATION: 97 % | HEIGHT: 68 IN | HEART RATE: 65 BPM | WEIGHT: 171 LBS | SYSTOLIC BLOOD PRESSURE: 141 MMHG | BODY MASS INDEX: 25.91 KG/M2

## 2022-08-08 DIAGNOSIS — I48.0 PAROXYSMAL ATRIAL FIBRILLATION: ICD-10-CM

## 2022-08-08 DIAGNOSIS — E78.5 HYPERLIPIDEMIA LDL GOAL <100: ICD-10-CM

## 2022-08-08 DIAGNOSIS — I25.118 CORONARY ARTERY DISEASE OF NATIVE ARTERY OF NATIVE HEART WITH STABLE ANGINA PECTORIS: Primary | ICD-10-CM

## 2022-08-08 DIAGNOSIS — I10 ESSENTIAL HYPERTENSION: ICD-10-CM

## 2022-08-08 PROCEDURE — 99214 OFFICE O/P EST MOD 30 MIN: CPT | Performed by: INTERNAL MEDICINE

## 2022-08-08 RX ORDER — ANTIOX #8/OM3/DHA/EPA/LUT/ZEAX 250-2.5 MG
1 CAPSULE ORAL 2 TIMES DAILY
COMMUNITY

## 2022-08-08 RX ORDER — CALCIUM CARBONATE 200(500)MG
2 TABLET,CHEWABLE ORAL AS NEEDED
COMMUNITY

## 2022-08-08 NOTE — PROGRESS NOTES
OFFICE VISIT  NOTE  [facility] CARDIOLOGY      Name: Phillip Addison    Date: 2022  MRN:  [id number]  :  1935      REFERRING/PRIMARY PROVIDER:  Fernando Dodson MD    Chief Complaint   Patient presents with   • Chest pain, unspecified type       HPI: Phillip Addison is a 86 y.o. male who presents today for follow-up of CAD and A. fib.  History of persistent atrial fibrillation diagnosed in the mid  intolerant to multiple antiarrhythmics, currently rate controlled with metoprolol and anticoagulated with Eliquis.  Status post PCI of the mid LAD 2022, with residual 70-80% OM 2 but less than 2 mm vessel, minimal RCA disease.  Allergic to statins.  Doing well since PCI, no chest pain or shortness of breath.  Palpitations well controlled on current dose of metoprolol.    Past Medical History:   Diagnosis Date   • Atrial fibrillation (HCC)    • Clotting disorder (HCC) 2017    in prostate       Past Surgical History:   Procedure Laterality Date   • APPENDECTOMY     • CARDIAC CATHETERIZATION Left 2022    Procedure: Left Heart Cath;  Surgeon: Norman Rankin MD;  Location: [facility] INVASIVE LOCATION;  Service: Cardiovascular;  Laterality: Left;   • CATARACT EXTRACTION     • COLONOSCOPY     • CORONARY STENT PLACEMENT  2022   • HERNIA REPAIR     • PROSTATE SURGERY     • SHOULDER SURGERY Right        Social History     Socioeconomic History   • Marital status:    Tobacco Use   • Smoking status: Never Smoker   • Smokeless tobacco: Never Used   • Tobacco comment: no tobacco ever   Vaping Use   • Vaping Use: Never used   Substance and Sexual Activity   • Alcohol use: Never   • Drug use: Never   • Sexual activity: Defer       History reviewed. No pertinent family history.     ROS:   Constitutional no fever,  no weight loss   Skin no rash, no subcutaneous nodules   Otolaryngeal no difficulty swallowing   Cardiovascular See HPI   Pulmonary no cough, no sputum production  "  Gastrointestinal no constipation, no diarrhea   Genitourinary no dysuria, no hematuria   Hematologic no easy bruisability, no abnormal bleeding   Musculoskeletal no muscle pain   Neurologic no dizziness, no falls         Allergies   Allergen Reactions   • Procainamide Unknown - High Severity and Other (See Comments)     Drug induced lupus     • Procaine Unknown - High Severity   • Statins Other (See Comments)     Tendon rupture   • Sulfamethoxazole-Trimethoprim Unknown - High Severity   • Aminosyn Other (See Comments)     Worsens AF   • Caffeine Other (See Comments) and Unknown - High Severity     Worsens AF     • Lansoprazole Other (See Comments)     Worsens AF   • Pantoprazole Sodium Other (See Comments)     Worsens AF   • Sulfites Unknown - Low Severity     Worsens AF   • Atorvastatin Unknown - Low Severity   • Penicillins Other (See Comments) and Unknown - Low Severity     Per pt \"does not work\"           Current Outpatient Medications:   •  apixaban (ELIQUIS) 5 MG tablet tablet, Take  by mouth. 5mg qam and 2.5mg qpm, Disp: , Rfl:   •  calcium carbonate (TUMS) 500 MG chewable tablet, Chew 2 tablets As Needed for Indigestion or Heartburn., Disp: , Rfl:   •  carboxymethylcellulose (REFRESH PLUS) 0.5 % solution, As Needed for Dry Eyes., Disp: , Rfl:   •  Cholecalciferol (VITAMIN D3 PO), Take 500 mcg by mouth Daily., Disp: , Rfl:   •  clopidogrel (PLAVIX) 75 MG tablet, Take 1 tablet by mouth Daily., Disp: 90 tablet, Rfl: 3  •  Dentifrices (BIOTENE DRY MOUTH DT), Apply  to teeth As Needed., Disp: , Rfl:   •  Fiber, Corn Dextrin, powder, Take 4 g by mouth Daily., Disp: , Rfl:   •  folic acid (FOLVITE) 400 MCG tablet, Take 400 mcg by mouth Daily., Disp: , Rfl:   •  metoprolol succinate XL (TOPROL-XL) 25 MG 24 hr tablet, Take  by mouth. 25mg qam and 12.5mg qpm, Disp: , Rfl:   •  multivitamins-minerals (PRESERVISION AREDS 2) capsule capsule, Take 1 capsule by mouth 2 (Two) Times a Day., Disp: , Rfl:   •  nitrofurantoin " "(MACRODANTIN) 50 MG capsule, Take 50 mg by mouth As Needed., Disp: , Rfl:   •  NON FORMULARY, H.A. Joint and Skin Super Formula. 100mg of Hylauronic Acid, plus Vit C, B12, magnesium. 1 capsule TID, Disp: , Rfl:   •  Vibegron (Gemtesa) 75 MG tablet, Take 0.5 tablets by mouth Daily., Disp: , Rfl:     Vitals:    08/08/22 1044   BP: 141/74   BP Location: Left arm   Patient Position: Sitting   Cuff Size: Adult   Pulse: 65   SpO2: 97%   Weight: 77.6 kg (171 lb)   Height: 172.7 cm (68\")     Body mass index is 26 kg/m².    PHYSICAL EXAM:    General Appearance:   · well developed  · well nourished  HENT:   · oropharynx moist  · lips not cyanotic  Neck:  · thyroid not enlarged  · supple  Respiratory:  · no respiratory distress  · normal breath sounds  · no rales  Cardiovascular:  · no jugular venous distention  · regular rhythm  · apical impulse normal  · S1 normal, S2 normal  · no S3, no S4   · no murmur  · no rub, no thrill  · carotid pulses normal; no bruit  · lower extremity edema: none      Musculoskeletal:  · no clubbing of fingers.   · normocephalic, head atraumatic  Skin:   · warm, dry  Psychiatric:  · judgement and insight appropriate  · normal mood and affect    RESULTS:   Procedures    Results for orders placed during the hospital encounter of 04/20/22    Adult Transthoracic Echo Complete W/ Cont if Necessary Per Protocol    Interpretation Summary  · Left ventricular ejection fraction appears to be 56 - 60%. Left ventricular systolic function is normal.  · Left ventricular diastolic function was normal.  · Left ventricular wall thickness is consistent with mild concentric hypertrophy.  · Left atrial volume is moderately increased.  · Estimated right ventricular systolic pressure from tricuspid regurgitation is normal (<35 mmHg). Calculated right ventricular systolic pressure from tricuspid regurgitation is 34 mmHg.        Labs:  Lab Results   Component Value Date    CHOL 216 (H) 04/20/2022    TRIG 82 04/20/2022    " HDL 62 (H) 04/20/2022     (H) 04/20/2022    AST 20 04/20/2022    ALT 16 04/20/2022     Lab Results   Component Value Date    HGBA1C 5.60 04/20/2022     No components found for: CREATINININE  No results found for: EGFRIFNONA    Most recent PCP note, imaging tests, and labs reviewed.    ASSESSMENT:  Problem List Items Addressed This Visit        Cardiac and Vasculature    Paroxysmal atrial fibrillation (HCC)    Overview     12/2021 48-hour Holter: 100% A.Fib, pauses up to 3.4 seconds, 0.1% PVCs         Essential hypertension    Hyperlipidemia LDL goal <100    Coronary artery disease of native artery of native heart with stable angina pectoris (HCC) - Primary    Overview     4/20/2022: LHC at [facility], mid LAD 80% stenosis status post PCI with a 3.0 x 16 mm Synergy YESI.  OM 2 diffuse 70-80% stenosis, 2 mm vessel, PCI deferred due to small caliber.  LVEDP 19 mmHg.  LVEF 60 to 65%.               PLAN:    1.  CAD:  PCI of the mid LAD 4/20/2022, OM 2 proximal and mid 70-80% but less than 2 mm vessel, medical therapy, dominant RCA 2030%.  Home blood pressure log which is extensive was reviewed.  Resume aspirin 81 mg daily after 11/1/2022  Continue Plavix 75 mg until 11/1/2022  PCSK9 inhibitor, was recommended but he declined    2.  Persistent atrial fibrillation:  Continue rate control strategy  Hold Eliquis 48 hours prior to procedures  Continue metoprolol at current dose  Holter 12/2021 showed average heart rate 70, 1% A. Fib    3.  Hypertension  Long-term goal blood pressure less than 140/90  Continue metoprolol      4.  Hyperlipidemia:  Allergic to statins, Repatha recommended but patient declined due to his age      Advance Care Planning   ACP discussion was held with the patient during this visit. Patient does not have an advance directive, information provided.         Return to clinic in 9 months, or sooner as needed.    Thank you for the opportunity to share in the care of your patient; please do not hesitate to  call me with any questions.     Norman Rankin MD, [facility]  Office: [phone] [address] [place] [address]    08/08/22

## 2023-05-08 ENCOUNTER — OFFICE VISIT (OUTPATIENT)
Dept: CARDIOLOGY | Facility: CLINIC | Age: 88
End: 2023-05-08
Payer: MEDICARE

## 2023-05-08 VITALS
HEART RATE: 83 BPM | WEIGHT: 170 LBS | DIASTOLIC BLOOD PRESSURE: 76 MMHG | SYSTOLIC BLOOD PRESSURE: 138 MMHG | BODY MASS INDEX: 25.76 KG/M2 | OXYGEN SATURATION: 99 % | HEIGHT: 68 IN

## 2023-05-08 DIAGNOSIS — E78.5 HYPERLIPIDEMIA LDL GOAL <100: ICD-10-CM

## 2023-05-08 DIAGNOSIS — I25.118 CORONARY ARTERY DISEASE OF NATIVE ARTERY OF NATIVE HEART WITH STABLE ANGINA PECTORIS: Primary | ICD-10-CM

## 2023-05-08 DIAGNOSIS — I10 ESSENTIAL HYPERTENSION: ICD-10-CM

## 2023-05-08 DIAGNOSIS — I48.0 PAROXYSMAL ATRIAL FIBRILLATION: ICD-10-CM

## 2023-05-08 RX ORDER — MIRABEGRON 50 MG/1
TABLET, FILM COATED, EXTENDED RELEASE ORAL
COMMUNITY
Start: 2023-02-03 | End: 2023-05-08

## 2023-05-08 RX ORDER — LISINOPRIL 2.5 MG/1
2.5 TABLET ORAL DAILY
COMMUNITY
Start: 2023-04-12

## 2023-05-08 RX ORDER — TRIAMCINOLONE ACETONIDE 5 MG/G
OINTMENT TOPICAL AS NEEDED
COMMUNITY
Start: 2023-04-07

## 2023-05-08 RX ORDER — ASPIRIN 81 MG/1
81 TABLET ORAL DAILY
COMMUNITY

## 2023-05-08 RX ORDER — OMEPRAZOLE 20 MG/1
20 CAPSULE, DELAYED RELEASE ORAL DAILY
COMMUNITY

## 2023-05-08 RX ORDER — LORATADINE 10 MG/1
10 TABLET ORAL DAILY
COMMUNITY

## 2023-05-08 NOTE — PROGRESS NOTES
OFFICE VISIT  NOTE  [facility] CARDIOLOGY [place]      Name: Phillip Addison    Date: 2023  MRN:  [id number]  :  1935      REFERRING/PRIMARY PROVIDER:  Fernando Dodson MD    Chief Complaint   Patient presents with   • Coronary Artery Disease              HPI: Phillip Addison is a 87 y.o. male who presents today for follow-up of CAD and A. fib.  History of persistent atrial fibrillation diagnosed in the mid  intolerant to multiple antiarrhythmics, currently rate controlled with metoprolol and anticoagulated with Eliquis.  Status post PCI of the mid LAD 2022, with residual 70-80% OM 2 but less than 2 mm vessel, minimal RCA disease.  Allergic to statins.  Denies chest pain or shortness of breath, gets occasional palpitations takes an extra metoprolol which controls symptoms    Past Medical History:   Diagnosis Date   • Atrial fibrillation    • Clotting disorder 2017    in prostate       Past Surgical History:   Procedure Laterality Date   • APPENDECTOMY     • CARDIAC CATHETERIZATION Left 2022    Procedure: Left Heart Cath;  Surgeon: Norman Rankin MD;  Location: [facility] INVASIVE LOCATION;  Service: Cardiovascular;  Laterality: Left;   • CATARACT EXTRACTION     • COLONOSCOPY     • CORONARY STENT PLACEMENT  2022   • HERNIA REPAIR     • PROSTATE SURGERY     • SHOULDER SURGERY Right        Social History     Socioeconomic History   • Marital status:    Tobacco Use   • Smoking status: Never   • Smokeless tobacco: Never   • Tobacco comments:     no tobacco ever   Vaping Use   • Vaping Use: Never used   Substance and Sexual Activity   • Alcohol use: Never   • Drug use: Never   • Sexual activity: Defer       History reviewed. No pertinent family history.     ROS:   Constitutional no fever,  no weight loss   Skin no rash, no subcutaneous nodules   Otolaryngeal no difficulty swallowing   Cardiovascular See HPI   Pulmonary no cough, no sputum production  "  Gastrointestinal no constipation, no diarrhea   Genitourinary no dysuria, no hematuria   Hematologic no easy bruisability, no abnormal bleeding   Musculoskeletal no muscle pain   Neurologic no dizziness, no falls         Allergies   Allergen Reactions   • Procainamide Unknown - High Severity and Other (See Comments)     Drug induced lupus     • Procaine Unknown - High Severity   • Statins Other (See Comments)     Tendon rupture   • Sulfamethoxazole-Trimethoprim Unknown - High Severity   • Aminosyn Other (See Comments)     Worsens AF   • Caffeine Other (See Comments) and Unknown - High Severity     Worsens AF     • Lansoprazole Other (See Comments)     Worsens AF   • Pantoprazole Sodium Other (See Comments)     Worsens AF   • Sulfites Unknown - Low Severity     Worsens AF   • Atorvastatin Unknown - Low Severity   • Penicillins Other (See Comments) and Unknown - Low Severity     Per pt \"does not work\"           Current Outpatient Medications:   •  apixaban (ELIQUIS) 5 MG tablet tablet, Take 1 tablet by mouth Every 12 (Twelve) Hours. 5mg qam and 2.5mg qpm, Disp: 180 tablet, Rfl: 3  •  aspirin 81 MG EC tablet, Take 1 tablet by mouth Daily., Disp: , Rfl:   •  calcium carbonate (TUMS) 500 MG chewable tablet, Chew 2 tablets As Needed for Indigestion or Heartburn., Disp: , Rfl:   •  carboxymethylcellulose (REFRESH PLUS) 0.5 % solution, As Needed for Dry Eyes., Disp: , Rfl:   •  Cholecalciferol (VITAMIN D3 PO), Take 500 mcg by mouth Daily., Disp: , Rfl:   •  Dentifrices (BIOTENE DRY MOUTH DT), Apply  to teeth As Needed., Disp: , Rfl:   •  Fiber, Corn Dextrin, powder, Take 4 g by mouth Daily., Disp: , Rfl:   •  folic acid (FOLVITE) 400 MCG tablet, Take 1 tablet by mouth Daily., Disp: , Rfl:   •  lisinopril (PRINIVIL,ZESTRIL) 2.5 MG tablet, Take 1 tablet by mouth Daily., Disp: , Rfl:   •  loratadine (CLARITIN) 10 MG tablet, Take 1 tablet by mouth Daily., Disp: , Rfl:   •  metoprolol succinate XL (TOPROL-XL) 25 MG 24 hr " "tablet, Take  by mouth. 25mg qam and 12.5mg qpm, Disp: , Rfl:   •  multivitamins-minerals (PRESERVISION AREDS 2) capsule capsule, Take 1 capsule by mouth 2 (Two) Times a Day., Disp: , Rfl:   •  nitrofurantoin (MACRODANTIN) 50 MG capsule, Take 1 capsule by mouth As Needed., Disp: , Rfl:   •  NON FORMULARY, H.A. Joint and Skin Super Formula. 100mg of Hylauronic Acid, plus Vit C, B12, magnesium. 1 capsule TID, Disp: , Rfl:   •  omeprazole (priLOSEC) 20 MG capsule, Take 1 capsule by mouth Daily. 1/2 daily, Disp: , Rfl:   •  triamcinolone (KENALOG) 0.5 % ointment, As Needed., Disp: , Rfl:   •  Vibegron (Gemtesa) 75 MG tablet, Take 0.5 tablets by mouth Daily., Disp: , Rfl:     Vitals:    05/08/23 1322   BP: 138/76   BP Location: Left arm   Patient Position: Sitting   Pulse: 83   SpO2: 99%   Weight: 77.1 kg (170 lb)   Height: 172.7 cm (68\")     Body mass index is 25.85 kg/m².    PHYSICAL EXAM:    General Appearance:   · well developed  · well nourished  HENT:   · oropharynx moist  · lips not cyanotic  Neck:  · thyroid not enlarged  · supple  Respiratory:  · no respiratory distress  · normal breath sounds  · no rales  Cardiovascular:  · no jugular venous distention  · irregular rhythm  · apical impulse normal  · S1 normal, S2 normal  · no S3, no S4   · no murmur  · no rub, no thrill  · carotid pulses normal; no bruit  · lower extremity edema: none      Musculoskeletal:  · no clubbing of fingers.   · normocephalic, head atraumatic  Skin:   · warm, dry  Psychiatric:  · judgement and insight appropriate  · normal mood and affect    RESULTS:   Procedures    Results for orders placed during the hospital encounter of 04/20/22    Adult Transthoracic Echo Complete W/ Cont if Necessary Per Protocol    Interpretation Summary  · Left ventricular ejection fraction appears to be 56 - 60%. Left ventricular systolic function is normal.  · Left ventricular diastolic function was normal.  · Left ventricular wall thickness is consistent with " mild concentric hypertrophy.  · Left atrial volume is moderately increased.  · Estimated right ventricular systolic pressure from tricuspid regurgitation is normal (<35 mmHg). Calculated right ventricular systolic pressure from tricuspid regurgitation is 34 mmHg.        Labs:  Lab Results   Component Value Date    CHOL 216 (H) 04/20/2022    TRIG 82 04/20/2022    HDL 62 (H) 04/20/2022     (H) 04/20/2022    AST 20 04/20/2022    ALT 16 04/20/2022     Lab Results   Component Value Date    HGBA1C 5.60 04/20/2022     No components found for: CREATINININE  No results found for: EGFRIFNONA    Most recent PCP note, imaging tests, and labs reviewed.    ASSESSMENT:  Problem List Items Addressed This Visit        Cardiac and Vasculature    Paroxysmal atrial fibrillation    Overview     12/2021 48-hour Holter: 100% A.Fib, pauses up to 3.4 seconds, 0.1% PVCs         Essential hypertension    Relevant Medications    lisinopril (PRINIVIL,ZESTRIL) 2.5 MG tablet    Hyperlipidemia LDL goal <100    Coronary artery disease of native artery of native heart with stable angina pectoris - Primary    Overview     4/20/2022: LHC at [facility], mid LAD 80% stenosis status post PCI with a 3.0 x 16 mm Synergy YESI.  OM 2 diffuse 70-80% stenosis, 2 mm vessel, PCI deferred due to small caliber.  LVEDP 19 mmHg.  LVEF 60 to 65%.            PLAN:    1.  CAD:  PCI of the mid LAD 4/20/2022, OM 2 proximal and mid 70-80% but less than 2 mm vessel, medical therapy, dominant RCA 2030%.  Home blood pressure log which is extensive was reviewed.  Resume aspirin 81 mg daily   He finished 1 year of Plavix  Consider bempedoic acid/ezetimibe for hyperlipidemia    2.  Persistent atrial fibrillation:  Continue rate control strategy  Hold Eliquis 48 hours prior to procedures  Continue metoprolol at current dose  Holter 12/2021 showed average heart rate 70, 1% A. Fib    3.  Hypertension  Long-term goal blood pressure less than 140/90  Continue metoprolol      4.   Hyperlipidemia:  Allergic to statins, Repatha recommended but patient declined due to his age  Could consider bempedoic acid but again due to advanced age not necessary      Advance Care Planning   ACP discussion was held with the patient during this visit. Patient does not have an advance directive, information provided.         Return to clinic in 12 months, or sooner as needed.    Thank you for the opportunity to share in the care of your patient; please do not hesitate to call me with any questions.     Norman Rankin MD, [facility]  Office: [phone] [address] [place] [id number]    05/08/23

## 2023-12-01 ENCOUNTER — TELEPHONE (OUTPATIENT)
Dept: CARDIOLOGY | Facility: CLINIC | Age: 88
End: 2023-12-01

## 2023-12-01 NOTE — TELEPHONE ENCOUNTER
Duration of symptoms:       BLOOD PRESSURE:  I  Is the patient reporting a “Low” blood pressure <90 systolic (top number) with symptoms? Yes  [x]    No  []                BP:100/59    Patient is feeling some lightheaded /59 for the past 2 days    **IF ANY OF THE ABOVE ARE \"YES\" PLEASE PLACE ON HOLD AND ROUTE THE CALL TO AN RN TO DISCUSS FURTHER   [facility] received a fax that requires your attention. The document has been indexed to the patient’s chart for your review.      Reason for sending: EXTERNAL MEDICAL RECORD NOTIFICATION     Documents Description: CT ABD W/-[facility]-11.30.23    Name of Sender: [facility]     Date Indexed: 11.30.23

## 2024-05-15 RX ORDER — APIXABAN 5 MG/1
TABLET, FILM COATED ORAL
Qty: 135 TABLET | Refills: 3 | Status: SHIPPED | OUTPATIENT
Start: 2024-05-15

## 2024-06-10 ENCOUNTER — OFFICE VISIT (OUTPATIENT)
Dept: CARDIOLOGY | Facility: CLINIC | Age: 89
End: 2024-06-10
Payer: MEDICARE

## 2024-06-10 VITALS
DIASTOLIC BLOOD PRESSURE: 78 MMHG | HEART RATE: 72 BPM | BODY MASS INDEX: 25.33 KG/M2 | SYSTOLIC BLOOD PRESSURE: 138 MMHG | HEIGHT: 69 IN | OXYGEN SATURATION: 99 % | WEIGHT: 171 LBS

## 2024-06-10 DIAGNOSIS — E78.5 HYPERLIPIDEMIA LDL GOAL <100: ICD-10-CM

## 2024-06-10 DIAGNOSIS — I48.0 PAROXYSMAL ATRIAL FIBRILLATION: ICD-10-CM

## 2024-06-10 DIAGNOSIS — I10 ESSENTIAL HYPERTENSION: ICD-10-CM

## 2024-06-10 DIAGNOSIS — I25.118 CORONARY ARTERY DISEASE OF NATIVE ARTERY OF NATIVE HEART WITH STABLE ANGINA PECTORIS: Primary | ICD-10-CM

## 2024-06-10 PROCEDURE — 99214 OFFICE O/P EST MOD 30 MIN: CPT | Performed by: INTERNAL MEDICINE

## 2024-06-10 RX ORDER — METOPROLOL SUCCINATE 25 MG/1
25 TABLET, EXTENDED RELEASE ORAL DAILY PRN
Qty: 90 TABLET | Refills: 3 | Status: SHIPPED | OUTPATIENT
Start: 2024-06-10 | End: 2024-06-10 | Stop reason: SDUPTHER

## 2024-06-10 RX ORDER — METOPROLOL SUCCINATE 25 MG/1
25 TABLET, EXTENDED RELEASE ORAL DAILY PRN
Qty: 90 TABLET | Refills: 3 | Status: SHIPPED | OUTPATIENT
Start: 2024-06-10 | End: 2024-06-12 | Stop reason: SDUPTHER

## 2024-06-10 NOTE — PROGRESS NOTES
OFFICE VISIT  NOTE  [facility] CARDIOLOGY      Name: Phillip Addison    Date: 6/10/2024  MRN:  [id number]  :  1935      REFERRING/PRIMARY PROVIDER:  Fernando Dodson MD    Chief Complaint   Patient presents with    Coronary artery disease of native artery of native heart wi       HPI: Phillip Addison is a 88 y.o. male who presents for CAD and A. fib.  History of persistent atrial fibrillation diagnosed in the mid  intolerant to multiple antiarrhythmics, currently rate controlled with metoprolol and anticoagulated with Eliquis.  Status post PCI of the mid LAD 2022, with residual 70-80% OM 2 but less than 2 mm vessel, minimal RCA disease.  Allergic to statins.  Concern about low heart rate sometimes in the low 40s average in the mid 50s to 60s.  Lack of energy main symptom.  Also reports easy bruising and bleeding with hematuria.    Past Medical History:   Diagnosis Date    Atrial fibrillation     Clotting disorder 2017    in prostate       Past Surgical History:   Procedure Laterality Date    APPENDECTOMY      CARDIAC CATHETERIZATION Left 2022    Procedure: Left Heart Cath;  Surgeon: Norman Rankin MD;  Location: [facility] INVASIVE LOCATION;  Service: Cardiovascular;  Laterality: Left;    CATARACT EXTRACTION      COLONOSCOPY      CORONARY STENT PLACEMENT  2022    HERNIA REPAIR      PROSTATE SURGERY      SHOULDER SURGERY Right        Social History     Socioeconomic History    Marital status:    Tobacco Use    Smoking status: Never    Smokeless tobacco: Never    Tobacco comments:     no tobacco ever   Vaping Use    Vaping status: Never Used   Substance and Sexual Activity    Alcohol use: Never    Drug use: Never    Sexual activity: Defer       History reviewed. No pertinent family history.     ROS:   Constitutional no fever,  no weight loss   Skin no rash, no subcutaneous nodules   Otolaryngeal no difficulty swallowing   Cardiovascular See HPI   Pulmonary no cough, no  "sputum production   Gastrointestinal no constipation, no diarrhea   Genitourinary no dysuria, no hematuria   Hematologic no easy bruisability, no abnormal bleeding   Musculoskeletal no muscle pain   Neurologic no dizziness, no falls         Allergies   Allergen Reactions    Procainamide Unknown - High Severity and Other (See Comments)     Drug induced lupus      Procaine Unknown - High Severity    Statins Other (See Comments)     Tendon rupture    Sulfamethoxazole-Trimethoprim Unknown - High Severity    Aminosyn Ii Other (See Comments)     Worsens AF    Caffeine Other (See Comments) and Unknown - High Severity     Worsens AF      Lansoprazole Other (See Comments)     Worsens AF    Pantoprazole Sodium Other (See Comments)     Worsens AF    Sulfites Unknown - Low Severity     Worsens AF    Atorvastatin Unknown - Low Severity    Penicillins Other (See Comments) and Unknown - Low Severity     Per pt \"does not work\"           Current Outpatient Medications:     apixaban (Eliquis) 5 MG tablet tablet, Take 0.5 tablets by mouth Every 12 (Twelve) Hours., Disp: 90 tablet, Rfl: 3    aspirin 81 MG EC tablet, Take 1 tablet by mouth Daily., Disp: , Rfl:     calcium carbonate (TUMS) 500 MG chewable tablet, Chew 2 tablets As Needed for Indigestion or Heartburn., Disp: , Rfl:     carboxymethylcellulose (REFRESH PLUS) 0.5 % solution, As Needed for Dry Eyes., Disp: , Rfl:     Cholecalciferol (VITAMIN D3 PO), Take 500 mcg by mouth Daily., Disp: , Rfl:     Dentifrices (BIOTENE DRY MOUTH DT), Apply  to teeth As Needed., Disp: , Rfl:     Fiber, Corn Dextrin, powder, Take 4 g by mouth Daily., Disp: , Rfl:     folic acid (FOLVITE) 400 MCG tablet, Take 1 tablet by mouth Daily., Disp: , Rfl:     lisinopril (PRINIVIL,ZESTRIL) 2.5 MG tablet, Take 1 tablet by mouth Daily., Disp: , Rfl:     loratadine (CLARITIN) 10 MG tablet, Take 1 tablet by mouth Daily., Disp: , Rfl:     metoprolol succinate XL (TOPROL-XL) 25 MG 24 hr tablet, Take 1 tablet by " "mouth Daily As Needed (palpitations). 0.5 tablet bid, Disp: 90 tablet, Rfl: 3    multivitamins-minerals (PRESERVISION AREDS 2) capsule capsule, Take 1 capsule by mouth 2 (Two) Times a Day., Disp: , Rfl:     nitrofurantoin (MACRODANTIN) 50 MG capsule, Take 1 capsule by mouth As Needed., Disp: , Rfl:     NON FORMULARY, H.A. Joint and Skin Super Formula. 100mg of Hylauronic Acid, plus Vit C, B12, magnesium. 1 capsule TID, Disp: , Rfl:     omeprazole (priLOSEC) 20 MG capsule, Take 1 capsule by mouth Daily. 1/2 daily, Disp: , Rfl:     triamcinolone (KENALOG) 0.5 % ointment, As Needed., Disp: , Rfl:     Vitals:    06/10/24 1312   BP: 138/78   BP Location: Left arm   Patient Position: Sitting   Pulse: 72   SpO2: 99%   Weight: 77.6 kg (171 lb)   Height: 174 cm (68.5\")       Body mass index is 25.62 kg/m².    PHYSICAL EXAM:    General Appearance:   well developed  well nourished  HENT:   oropharynx moist  lips not cyanotic  Neck:  thyroid not enlarged  supple  Respiratory:  no respiratory distress  normal breath sounds  no rales  Cardiovascular:  no jugular venous distention  irregular rhythm  apical impulse normal  S1 normal, S2 normal  no S3, no S4   no murmur  no rub, no thrill  carotid pulses normal; no bruit  lower extremity edema: none      Musculoskeletal:  no clubbing of fingers.   normocephalic, head atraumatic  Skin:   warm, dry  Psychiatric:  judgement and insight appropriate  normal mood and affect    RESULTS:   Procedures    Results for orders placed during the hospital encounter of 04/20/22    Adult Transthoracic Echo Complete W/ Cont if Necessary Per Protocol    Interpretation Summary  · Left ventricular ejection fraction appears to be 56 - 60%. Left ventricular systolic function is normal.  · Left ventricular diastolic function was normal.  · Left ventricular wall thickness is consistent with mild concentric hypertrophy.  · Left atrial volume is moderately increased.  · Estimated right ventricular systolic " "pressure from tricuspid regurgitation is normal (<35 mmHg). Calculated right ventricular systolic pressure from tricuspid regurgitation is 34 mmHg.        Labs:  Lab Results   Component Value Date    CHOL 216 (H) 04/20/2022    TRIG 82 04/20/2022    HDL 62 (H) 04/20/2022     (H) 04/20/2022    AST 20 04/20/2022    ALT 16 04/20/2022     Lab Results   Component Value Date    HGBA1C 5.60 04/20/2022     No components found for: \"CREATINININE\"  No results found for: \"EGFRIFNONA\"    Most recent PCP note, imaging tests, and labs reviewed.    ASSESSMENT:  Problem List Items Addressed This Visit       Paroxysmal atrial fibrillation    Overview     12/2021 48-hour Holter: 100% A.Fib, pauses up to 3.4 seconds, 0.1% PVCs         Relevant Medications    metoprolol succinate XL (TOPROL-XL) 25 MG 24 hr tablet    Essential hypertension    Relevant Medications    metoprolol succinate XL (TOPROL-XL) 25 MG 24 hr tablet    Hyperlipidemia LDL goal <100    Coronary artery disease of native artery of native heart with stable angina pectoris - Primary    Overview     4/20/2022: LHC at [facility], mid LAD 80% stenosis status post PCI with a 3.0 x 16 mm Synergy YESI.  OM 2 diffuse 70-80% stenosis, 2 mm vessel, PCI deferred due to small caliber.  LVEDP 19 mmHg.  LVEF 60 to 65%.         Relevant Medications    metoprolol succinate XL (TOPROL-XL) 25 MG 24 hr tablet       PLAN:    1.  CAD:  PCI of the mid LAD 4/20/2022, OM 2 proximal and mid 70-80% but less than 2 mm vessel, medical therapy, dominant RCA 20-30%.  Home blood pressure log which is extensive was reviewed.   Continue aspirin 1 mg daily  He finished 1 year of Plavix  Consider bempedoic acid/ezetimibe for hyperlipidemia    2.  Persistent atrial fibrillation:  Continue rate control strategy  Decrease Eliquis dose to 2.5 mg twice daily due to patient request.  Hold Eliquis 48 hours prior to procedures  Wean off metoprolol due to bradycardia  Holter 12/2021 showed average heart rate 70, 1% " A. Fib    3.  Hypertension  Long-term goal blood pressure less than 140/90  Monitor closely as we wean off metoprolol, can increase lisinopril if needed      4.  Hyperlipidemia:  Allergic to statins, Repatha recommended but patient declined due to his age  Could consider bempedoic acid but again due to advanced age not necessary    5.      Advance Care Planning   ACP discussion was held with the patient during this visit. Patient does not have an advance directive, information provided.         Return to clinic in 12 months, or sooner as needed.    Thank you for the opportunity to share in the care of your patient; please do not hesitate to call me with any questions.     Norman Rankin MD, [facility]  Office: [phone] [address] [place] [address]    06/10/24

## 2024-06-12 ENCOUNTER — TELEPHONE (OUTPATIENT)
Dept: CARDIOLOGY | Facility: HOSPITAL | Age: 89
End: 2024-06-12
Payer: MEDICARE

## 2024-06-12 RX ORDER — METOPROLOL SUCCINATE 25 MG/1
TABLET, EXTENDED RELEASE ORAL
Qty: 90 TABLET | Refills: 3 | Status: SHIPPED | OUTPATIENT
Start: 2024-06-12

## 2025-04-29 RX ORDER — METOPROLOL SUCCINATE 25 MG/1
TABLET, EXTENDED RELEASE ORAL
Qty: 90 TABLET | Refills: 0 | Status: SHIPPED | OUTPATIENT
Start: 2025-04-29

## 2025-06-04 ENCOUNTER — TELEPHONE (OUTPATIENT)
Dept: CARDIOLOGY | Facility: CLINIC | Age: OVER 89
End: 2025-06-04
Payer: MEDICARE

## 2025-06-09 ENCOUNTER — OFFICE VISIT (OUTPATIENT)
Dept: CARDIOLOGY | Facility: CLINIC | Age: OVER 89
End: 2025-06-09
Payer: MEDICARE

## 2025-06-09 VITALS
OXYGEN SATURATION: 99 % | HEART RATE: 68 BPM | DIASTOLIC BLOOD PRESSURE: 82 MMHG | BODY MASS INDEX: 25.25 KG/M2 | SYSTOLIC BLOOD PRESSURE: 130 MMHG | HEIGHT: 69 IN

## 2025-06-09 DIAGNOSIS — I25.118 CORONARY ARTERY DISEASE OF NATIVE ARTERY OF NATIVE HEART WITH STABLE ANGINA PECTORIS: Primary | Chronic | ICD-10-CM

## 2025-06-09 DIAGNOSIS — I10 ESSENTIAL HYPERTENSION: Chronic | ICD-10-CM

## 2025-06-09 DIAGNOSIS — I48.0 PAROXYSMAL ATRIAL FIBRILLATION: Chronic | ICD-10-CM

## 2025-06-09 DIAGNOSIS — E78.5 HYPERLIPIDEMIA LDL GOAL <100: Chronic | ICD-10-CM

## 2025-06-09 DIAGNOSIS — I87.2 VENOUS INSUFFICIENCY OF BOTH LOWER EXTREMITIES: ICD-10-CM

## 2025-06-09 PROCEDURE — 99214 OFFICE O/P EST MOD 30 MIN: CPT | Performed by: INTERNAL MEDICINE

## 2025-06-09 PROCEDURE — G2211 COMPLEX E/M VISIT ADD ON: HCPCS | Performed by: INTERNAL MEDICINE

## 2025-06-09 RX ORDER — METOPROLOL SUCCINATE 25 MG/1
12.5 TABLET, EXTENDED RELEASE ORAL DAILY
Qty: 45 TABLET | Refills: 3 | Status: SHIPPED | OUTPATIENT
Start: 2025-06-09

## 2025-06-09 RX ORDER — SILODOSIN 8 MG/1
CAPSULE ORAL
COMMUNITY
Start: 2025-05-31

## 2025-06-09 NOTE — PROGRESS NOTES
OFFICE VISIT  NOTE  [facility] CARDIOLOGY      Name: Phillip Addison    Date: 2025  MRN:  [id number]  :  1935      REFERRING/PRIMARY PROVIDER:  Fernando Dodson MD    Chief Complaint   Patient presents with    Coronary artery disease of native artery of native heart wi       HPI: Phillip Addison is a 89 y.o. male who presents for CAD and A. fib.  History of persistent atrial fibrillation diagnosed in the mid  intolerant to multiple antiarrhythmics, currently rate controlled with metoprolol and anticoagulated with Eliquis.  Status post PCI of the mid LAD 2022, with residual 70-80% OM 2 but less than 2 mm vessel, minimal RCA disease.  Allergic to statins.  Overall doing well, he stopped lisinopril as it has not made his BPH symptoms worse, he is can see Dr. Valles probably will need another procedure asking for preop instructions.  No chest pain or shortness of breath currently.  Blood pressure between 128 and 140 systolic.    Past Medical History:   Diagnosis Date    Atrial fibrillation     Clotting disorder 2017    in prostate       Past Surgical History:   Procedure Laterality Date    APPENDECTOMY      CARDIAC CATHETERIZATION Left 2022    Procedure: Left Heart Cath;  Surgeon: Norman Rankin MD;  Location: [facility] CATH INVASIVE LOCATION;  Service: Cardiovascular;  Laterality: Left;    CATARACT EXTRACTION      COLONOSCOPY      CORONARY STENT PLACEMENT  2022    HERNIA REPAIR      PROSTATE SURGERY      SHOULDER SURGERY Right        Social History     Socioeconomic History    Marital status:    Tobacco Use    Smoking status: Never    Smokeless tobacco: Never    Tobacco comments:     no tobacco ever   Vaping Use    Vaping status: Never Used   Substance and Sexual Activity    Alcohol use: Never    Drug use: Never    Sexual activity: Defer       History reviewed. No pertinent family history.     ROS:   Constitutional no fever,  no weight loss   Skin no rash, no  "subcutaneous nodules   Otolaryngeal no difficulty swallowing   Cardiovascular See HPI   Pulmonary no cough, no sputum production   Gastrointestinal no constipation, no diarrhea   Genitourinary no dysuria, no hematuria   Hematologic no easy bruisability, no abnormal bleeding   Musculoskeletal no muscle pain   Neurologic no dizziness, no falls         Allergies   Allergen Reactions    Procainamide Unknown - High Severity and Other (See Comments)     Drug induced lupus      Procaine Unknown - High Severity    Statins Other (See Comments)     Tendon rupture    Sulfamethoxazole-Trimethoprim Unknown - High Severity    Aminosyn Ii Other (See Comments)     Worsens AF    Caffeine Other (See Comments) and Unknown - High Severity     Worsens AF      Lansoprazole Other (See Comments)     Worsens AF    Pantoprazole Sodium Other (See Comments)     Worsens AF    Sulfites Unknown - Low Severity     Worsens AF    Atorvastatin Unknown - Low Severity    Penicillins Other (See Comments) and Unknown - Low Severity     Per pt \"does not work\"           Current Outpatient Medications:     apixaban (Eliquis) 5 MG tablet tablet, Take 0.5 tablets by mouth Every 12 (Twelve) Hours., Disp: 90 tablet, Rfl: 3    aspirin 81 MG EC tablet, Take 1 tablet by mouth Daily., Disp: , Rfl:     calcium carbonate (TUMS) 500 MG chewable tablet, Chew 2 tablets As Needed for Indigestion or Heartburn., Disp: , Rfl:     carboxymethylcellulose (REFRESH PLUS) 0.5 % solution, As Needed for Dry Eyes., Disp: , Rfl:     Cholecalciferol (VITAMIN D3 PO), Take 500 mcg by mouth Daily., Disp: , Rfl:     Dentifrices (BIOTENE DRY MOUTH DT), Apply  to teeth As Needed., Disp: , Rfl:     Fiber, Corn Dextrin, powder, Take 4 g by mouth Daily., Disp: , Rfl:     folic acid (FOLVITE) 400 MCG tablet, Take 1 tablet by mouth Daily., Disp: , Rfl:     loratadine (CLARITIN) 10 MG tablet, Take 1 tablet by mouth Daily., Disp: , Rfl:     metoprolol succinate XL (TOPROL-XL) 25 MG 24 hr tablet, " "Take 0.5 tablets by mouth Daily. Take 0.5 tablet by mouth twice a day, Disp: 45 tablet, Rfl: 3    multivitamins-minerals (PRESERVISION AREDS 2) capsule capsule, Take 1 capsule by mouth 2 (Two) Times a Day., Disp: , Rfl:     nitrofurantoin (MACRODANTIN) 50 MG capsule, Take 1 capsule by mouth As Needed., Disp: , Rfl:     NON FORMULARY, H.A. Joint and Skin Super Formula. 100mg of Hylauronic Acid, plus Vit C, B12, magnesium. 1 capsule TID, Disp: , Rfl:     omeprazole (priLOSEC) 20 MG capsule, Take 1 capsule by mouth Daily. 1/2 daily, Disp: , Rfl:     silodosin (RAPAFLO) 8 MG capsule capsule, , Disp: , Rfl:     triamcinolone (KENALOG) 0.5 % ointment, As Needed., Disp: , Rfl:     Vitals:    06/09/25 1254   BP: 130/82   Pulse: 68   SpO2: 99%   Height: 175.3 cm (69\")         Body mass index is 25.25 kg/m².    PHYSICAL EXAM:    General Appearance:   · well developed  · well nourished  Neck:  · thyroid not enlarged  · supple  Respiratory:  · no respiratory distress  · normal breath sounds  · no rales  Cardiovascular:  · no jugular venous distention  · regular rhythm  · apical impulse normal  · S1 normal, S2 normal  · no S3, no S4   · no murmur  · no rub, no thrill  · carotid pulses normal; no bruit  · pedal pulses normal  · lower extremity edema: none    Skin:   warm, dry      RESULTS:   Procedures    Results for orders placed during the hospital encounter of 04/20/22    Adult Transthoracic Echo Complete W/ Cont if Necessary Per Protocol    Interpretation Summary  · Left ventricular ejection fraction appears to be 56 - 60%. Left ventricular systolic function is normal.  · Left ventricular diastolic function was normal.  · Left ventricular wall thickness is consistent with mild concentric hypertrophy.  · Left atrial volume is moderately increased.  · Estimated right ventricular systolic pressure from tricuspid regurgitation is normal (<35 mmHg). Calculated right ventricular systolic pressure from tricuspid regurgitation is 34 " "mmHg.        Labs:  Lab Results   Component Value Date    CHOL 216 (H) 04/20/2022    TRIG 82 04/20/2022    HDL 62 (H) 04/20/2022     (H) 04/20/2022    AST 20 04/20/2022    ALT 16 04/20/2022     Lab Results   Component Value Date    HGBA1C 5.60 04/20/2022     No components found for: \"CREATINININE\"  No results found for: \"EGFRIFNONA\"    Most recent PCP note, imaging tests, and labs reviewed.    ASSESSMENT:  Problem List Items Addressed This Visit       Paroxysmal atrial fibrillation (Chronic)    Overview   12/2021 48-hour Holter: 100% A.Fib, pauses up to 3.4 seconds, 0.1% PVCs         Relevant Medications    metoprolol succinate XL (TOPROL-XL) 25 MG 24 hr tablet    Essential hypertension (Chronic)    Relevant Medications    metoprolol succinate XL (TOPROL-XL) 25 MG 24 hr tablet    Hyperlipidemia LDL goal <100 (Chronic)    Coronary artery disease of native artery of native heart with stable angina pectoris - Primary (Chronic)    Overview   4/20/2022: [facility], mid LAD 80% stenosis status post PCI with a 3.0 x 16 mm Synergy YESI.  OM 2 diffuse 70-80% stenosis, 2 mm vessel, PCI deferred due to small caliber.  LVEDP 19 mmHg.  LVEF 60 to 65%.         Relevant Medications    metoprolol succinate XL (TOPROL-XL) 25 MG 24 hr tablet    Venous insufficiency of both lower extremities       PLAN:    1.  CAD:  PCI of the mid LAD 4/20/2022, OM 2 proximal and mid 70-80% but less than 2 mm vessel, medical therapy, dominant RCA 20-30%.  Home blood pressure log reviewed.   Continue continue aspirin a 1 mg daily due to small vessel disease even though he is also on Eliquis  We offered alternatives to statins for hyperlipidemia but he declined    2.  Persistent atrial fibrillation:  Continue rate control strategy  Decreased Eliquis dose to 2.5 mg twice daily due to patient request.  Hold Eliquis 48 hours prior to procedures  Weaned off metoprolol due to bradycardia  Holter 12/2021 showed average heart rate 70, 1% A. Fib    EKG " today shows atrial fibrillation with a heart rate of 59 and 1 PVC.    3.  Hypertension  Long-term goal blood pressure less than 140/90  Overall doing well, off lisinopril okay to continue metoprolol 12.5 mg XL daily      4.  Hyperlipidemia:  Allergic to statins, Repatha recommended but patient declined due to his age  He declines alternative  LDL 90 7/2025    5.  Preop cardiovascular assessment:  Overall low CV risk for planned procedure, stop Eliquis 48 hours prior to procedure then resume the day after, continue low-dose aspirin      Advance Care Planning   ACP discussion was held with the patient during this visit. Patient does not have an advance directive, information provided.         Return to clinic in 12 months, or sooner as needed.    Thank you for the opportunity to share in the care of your patient; please do not hesitate to call me with any questions.     Norman Rankin MD, [facility]  Office: [phone] [address] [place] [address]    06/09/25

## 2025-07-22 RX ORDER — METOPROLOL SUCCINATE 25 MG/1
12.5 TABLET, EXTENDED RELEASE ORAL EVERY 12 HOURS SCHEDULED
Qty: 90 TABLET | Refills: 3 | Status: SHIPPED | OUTPATIENT
Start: 2025-07-22

## (undated) DEVICE — CATH DIAG EXPO M/ PK 5F FL4/FR4 PIG

## (undated) DEVICE — GLIDESHEATH SLENDER STAINLESS STEEL KIT: Brand: GLIDESHEATH SLENDER

## (undated) DEVICE — DEV COMP RAD PRELUDESYNC 24CM

## (undated) DEVICE — PK CATH CARD 10

## (undated) DEVICE — Device: Brand: ASAHI SION BLUE

## (undated) DEVICE — GW PERIPH GUIDERIGHT STD/EXCHNG/J/TIP SS 0.035IN 5X260CM

## (undated) DEVICE — CATH DIAG EXPO .045 FL3  5F 100CM

## (undated) DEVICE — MODEL BT2000 P/N 700287-012KIT CONTENTS: MANIFOLD WITH SALINE AND CONTRAST PORTS, SALINE TUBING WITH SPIKE AND HAND SYRINGE, TRANSDUCER: Brand: BT2000 AUTOMATED MANIFOLD KIT

## (undated) DEVICE — MODEL AT P65, P/N 701554-001KIT CONTENTS: HAND CONTROLLER, 3-WAY HIGH-PRESSURE STOPCOCK WITH ROTATING END AND PREMIUM HIGH-PRESSURE TUBING: Brand: ANGIOTOUCH® KIT

## (undated) DEVICE — GUIDE CATHETER: Brand: MACH1™

## (undated) DEVICE — DEV INFL MONARCH 25W

## (undated) DEVICE — NC TREK CORONARY DILATATION CATHETER 2.5 MM X 12 MM / RAPID-EXCHANGE: Brand: NC TREK